# Patient Record
Sex: MALE | Race: WHITE | URBAN - METROPOLITAN AREA
[De-identification: names, ages, dates, MRNs, and addresses within clinical notes are randomized per-mention and may not be internally consistent; named-entity substitution may affect disease eponyms.]

---

## 2019-07-26 ENCOUNTER — HISTORICAL (OUTPATIENT)
Dept: ADMINISTRATIVE | Facility: HOSPITAL | Age: 29
End: 2019-07-26

## 2022-04-11 ENCOUNTER — HISTORICAL (OUTPATIENT)
Dept: ADMINISTRATIVE | Facility: HOSPITAL | Age: 32
End: 2022-04-11

## 2022-04-27 VITALS
HEIGHT: 71 IN | WEIGHT: 275.56 LBS | OXYGEN SATURATION: 98 % | BODY MASS INDEX: 38.58 KG/M2 | DIASTOLIC BLOOD PRESSURE: 86 MMHG | SYSTOLIC BLOOD PRESSURE: 138 MMHG

## 2022-05-04 NOTE — HISTORICAL [facility] CERNER
This is a historical note converted from Frank. Formatting and pictures may have been removed.  Please reference Frank for original formatting and attached multimedia. Chief Complaint  Pain to lateral side of right ankle onset yesterday after laterally rotating ankle  History of Present Illness  28-year-old male presents with concern of?ankle pain after retaining his ankle yesterday.?Pain mainly to the lateral side of the right ankle.?No radiation to the calf. No numbness or tingling.? Patient has fractured his ankle in the past  Review of Systems  Constitutional_negative for fever  ENMT_negative for rhinorrhea, sinus pressure, sore throat,?blurry vision or change in vision  Respiratory_negative for?cough  Gastrointestinal_negative for nausea or vomiting  Integumentary_per HPI  Physical Exam  Vitals & Measurements  T:?36.6? ?C (Oral)? HR:?94(Peripheral)? RR:?16? BP:?138/86? SpO2:?98%?  HT:?180?cm? WT:?125?kg? BMI:?38.58?  Gen: WD NAD  CV: S1S2 RRR  MS: +TTP of lateral ankle R side, no TTP of foot, no Achilles TTP,  Extr: + edema to the R ankle  Integument: warm, no rashes or lesions  Psych: Cooperative, approp mood and affect  Assessment/Plan  1.?Tobacco user?Z72.0  ?Recommend cessation  Ordered:  meloxicam, 15 mg = 1 tab(s), Oral, Daily, # 30 tab(s), 0 Refill(s), Pharmacy: [facility] #[id number]  Office/Outpatient Visit Level 4 Established [id number] PC, Tobacco user  Right ankle pain, [facility], 07/26/19 14:23:00 CDT  ?  2.?Right ankle pain?M25.571  ?Ice, elevate, wrap, Mobic with food daily.?If not improving recommend MRI.  Ordered:  meloxicam, 15 mg = 1 tab(s), Oral, Daily, # 30 tab(s), 0 Refill(s), Pharmacy: [facility] #[id number]  MRI Ext Lower Joint Right W/O Contrast, Routine, 07/26/19 14:23:00 CDT, Injury, knee & below, lateral and anterior ankle pain, None, Ambulatory, Rad Type, Order for future visit, Right ankle pain, Schedule this test, [facility], 07/26/19 14:23:00  CDT  Office/Outpatient Visit Level 4 Established [id number] PC, Tobacco user  Right ankle pain, [facility], 07/26/19 14:23:00 CDT  XR Ankle Right Minimum 3 Views, Routine, 07/26/19 14:23:00 CDT, Follow Up Trauma, lateral ankle pain, None, Ambulatory, Rad Type, Right ankle pain, Not Scheduled, 07/26/19 14:23:00 CDT  ?  3.?Right ankle sprain?S93.401A  Ordered:  meloxicam, 15 mg = 1 tab(s), Oral, Daily, # 30 tab(s), 0 Refill(s), Pharmacy: [facility] #[id number]  ?   Problem List/Past Medical History  Ongoing  Obesity  Tobacco user  Historical  No qualifying data  Procedure/Surgical History  right hand surgery   Medications  No active medications  Allergies  No Known Allergies  Social History  Abuse/Neglect  No, 07/26/2019  Alcohol  Current, 1-2 times per month, 09/05/2017  Substance Use  Never, 09/05/2017  Tobacco  Never (less than 100 in lifetime), N/A, 07/26/2019  Family History  Family history is unknown  Health Maintenance  Health Maintenance  ???Pending?(in the next year)  ??? ??OverDue  ??? ? ? ?Alcohol Misuse Screening due??01/01/19??and every 1??year(s)  ??? ? ? ?Smoking Cessation due??01/01/19??and every 1??year(s)  ??? ??Due?  ??? ? ? ?ADL Screening due??07/26/19??and every 1??year(s)  ??? ? ? ?Depression Screening due??07/26/19??and every?  ??? ? ? ?Tetanus Vaccine due??07/26/19??and every 10??year(s)  ??? ??Due In Future?  ??? ? ? ?Obesity Screening not due until??01/01/20??and every 1??year(s)  ??? ? ? ?Blood Pressure Screening not due until??07/25/20??and every 1??year(s)  ??? ? ? ?Body Mass Index Check not due until??07/25/20??and every 1??year(s)  ???Satisfied?(in the past 1 year)  ??? ??Satisfied?  ??? ? ? ?Blood Pressure Screening on??07/26/19.??Satisfied by Markel Power LPN  ??? ? ? ?Body Mass Index Check on??07/26/19.??Satisfied by Markel Power LPN  ??? ? ? ?Obesity Screening on??07/26/19.??Satisfied by Markel Power LPN  ?